# Patient Record
Sex: MALE | Race: WHITE | Employment: FULL TIME | URBAN - METROPOLITAN AREA
[De-identification: names, ages, dates, MRNs, and addresses within clinical notes are randomized per-mention and may not be internally consistent; named-entity substitution may affect disease eponyms.]

---

## 2024-05-21 ENCOUNTER — HOSPITAL ENCOUNTER (EMERGENCY)
Age: 64
Discharge: HOME | End: 2024-05-21
Payer: OTHER MISCELLANEOUS

## 2024-05-21 ENCOUNTER — APPOINTMENT (OUTPATIENT)
Dept: GENERAL RADIOLOGY | Age: 64
End: 2024-05-21
Payer: OTHER MISCELLANEOUS

## 2024-05-21 VITALS
HEART RATE: 79 BPM | DIASTOLIC BLOOD PRESSURE: 94 MMHG | RESPIRATION RATE: 18 BRPM | SYSTOLIC BLOOD PRESSURE: 146 MMHG | WEIGHT: 160 LBS | BODY MASS INDEX: 25.11 KG/M2 | TEMPERATURE: 98 F | OXYGEN SATURATION: 100 % | HEIGHT: 67 IN

## 2024-05-21 DIAGNOSIS — S80.11XA CONTUSION OF RIGHT LOWER LEG, INITIAL ENCOUNTER: Primary | ICD-10-CM

## 2024-05-21 DIAGNOSIS — S43.402A SPRAIN OF LEFT SHOULDER, UNSPECIFIED SHOULDER SPRAIN TYPE, INITIAL ENCOUNTER: ICD-10-CM

## 2024-05-21 PROCEDURE — 73590 X-RAY EXAM OF LOWER LEG: CPT

## 2024-05-21 PROCEDURE — 99283 EMERGENCY DEPT VISIT LOW MDM: CPT

## 2024-05-21 PROCEDURE — 73030 X-RAY EXAM OF SHOULDER: CPT

## 2024-05-21 ASSESSMENT — PAIN DESCRIPTION - DESCRIPTORS: DESCRIPTORS: DISCOMFORT

## 2024-05-21 ASSESSMENT — PAIN DESCRIPTION - PAIN TYPE: TYPE: ACUTE PAIN

## 2024-05-21 ASSESSMENT — PAIN DESCRIPTION - ORIENTATION: ORIENTATION: LEFT

## 2024-05-21 ASSESSMENT — PAIN DESCRIPTION - FREQUENCY: FREQUENCY: CONTINUOUS

## 2024-05-21 ASSESSMENT — PAIN DESCRIPTION - LOCATION: LOCATION: SHOULDER

## 2024-05-21 ASSESSMENT — PAIN SCALES - GENERAL: PAINLEVEL_OUTOF10: 6

## 2024-05-21 ASSESSMENT — PAIN DESCRIPTION - ONSET: ONSET: ON-GOING

## 2024-05-21 ASSESSMENT — PAIN - FUNCTIONAL ASSESSMENT: PAIN_FUNCTIONAL_ASSESSMENT: 0-10

## 2024-05-21 NOTE — ED PROVIDER NOTES
[facility]  Department of Emergency Medicine   ED  Encounter Note  Admit Date/RoomTime: 2024  4:02 PM  ED Room: 33    NAME: Gideon Zamorano  : 1960  MRN: [id number]     Chief Complaint:  Shoulder Injury (Left shoulder   fell onto left shoulder ) and Leg Injury (Right shin.  .  )    History of Present Illness       Gideon Zamorano is a 63 y.o. old male who presents to the emergency department by private vehicle, for a mechanical fall which occured 1 month(s) prior to arrival. He reportedly fell while working on roof, fell from a standing height, and tripped over a roof falling onto his left side injuring his left shoulder and striking his right shin on a vent pipe.The patients tetanus status is unknown, the patient did not sustain any open injuries.  Since onset the symptoms have been gradually improving.  His pain is aggraveated by extending and relieved by rest of injured area.  Patient only complains of pain of the left shoulder and right anterior shin at this time.  Patient denies striking his head or loss of consciousness.  Patient denies neck pain or paresthesias to the extremities.  He takes no blood thinning agents.  Patient was advised to come to the emergency department for evaluation as it is occurred at work and will be a Workmen's Compensation case.  N/A  ROS   Pertinent positives and negatives are stated within HPI, all other systems reviewed and are negative.    Past Medical History:  has no past medical history on file.    Surgical History:  has no past surgical history on file.    Social History:      Family History: family history is not on file.     Allergies: Amoxil [amoxicillin], Declomycin [demeclocycline], and Pcn [penicillins]    Physical Exam   Oxygen Saturation Interpretation: Normal.        ED Triage Vitals   BP Temp Temp Source Pulse Respirations SpO2 Height Weight - Scale   24 1437 24 1434 24 1434 24 1434 24 1434 24

## 2024-05-21 NOTE — DISCHARGE INSTRUCTIONS
Take Motrin or Tylenol as needed for pain.  Contact Occupational Health tomorrow to schedule a re-evaluation.